# Patient Record
Sex: FEMALE | Race: WHITE | Employment: UNEMPLOYED | URBAN - METROPOLITAN AREA
[De-identification: names, ages, dates, MRNs, and addresses within clinical notes are randomized per-mention and may not be internally consistent; named-entity substitution may affect disease eponyms.]

---

## 2024-01-11 ENCOUNTER — APPOINTMENT (OUTPATIENT)
Dept: GENERAL RADIOLOGY | Age: 79
DRG: 101 | End: 2024-01-11
Payer: MEDICARE

## 2024-01-11 ENCOUNTER — APPOINTMENT (OUTPATIENT)
Dept: CT IMAGING | Age: 79
DRG: 101 | End: 2024-01-11
Payer: MEDICARE

## 2024-01-11 ENCOUNTER — HOSPITAL ENCOUNTER (INPATIENT)
Age: 79
LOS: 1 days | Discharge: HOME | DRG: 101 | End: 2024-01-13
Attending: STUDENT IN AN ORGANIZED HEALTH CARE EDUCATION/TRAINING PROGRAM | Admitting: INTERNAL MEDICINE
Payer: MEDICARE

## 2024-01-11 DIAGNOSIS — R56.9 SEIZURE (HCC): ICD-10-CM

## 2024-01-11 DIAGNOSIS — R56.9 SEIZURE-LIKE ACTIVITY (HCC): ICD-10-CM
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
## 2024-01-11 DIAGNOSIS — S09.90XA TRAUMATIC INJURY OF HEAD, INITIAL ENCOUNTER: Primary | ICD-10-CM

## 2024-01-11 DIAGNOSIS — S01.01XA LACERATION OF SCALP, INITIAL ENCOUNTER: ICD-10-CM

## 2024-01-11 LAB
B.E.: -1.2 MMOL/L (ref -3–3)
COHB: 3.2 % (ref 0–1.5)
CRITICAL: ABNORMAL
DATE ANALYZED: ABNORMAL
DATE OF COLLECTION: ABNORMAL
ERYTHROCYTE [DISTWIDTH] IN BLOOD BY AUTOMATED COUNT: 13.9 % (ref 11.5–15)
HCO3: 23.7 MMOL/L (ref 22–26)
HCT VFR BLD AUTO: 34.8 % (ref 34–48)
HGB BLD-MCNC: 11.4 G/DL (ref 11.5–15.5)
HHB: 0.5 % (ref 0–5)
INR PPP: 1.2
LAB: ABNORMAL
Lab: 2255
MCH RBC QN AUTO: 31.1 PG (ref 26–35)
MCHC RBC AUTO-ENTMCNC: 32.8 G/DL (ref 32–34.5)
MCV RBC AUTO: 94.8 FL (ref 80–99.9)
METHB: 0.2 % (ref 0–1.5)
MODE: ABNORMAL
O2 CONTENT: 17.3 ML/DL
O2 SATURATION: 99.5 % (ref 92–98.5)
O2HB: 96.1 % (ref 94–97)
OPERATOR ID: 2577
PARTIAL THROMBOPLASTIN TIME: 32.8 SEC (ref 24.5–35.1)
PATIENT TEMP: 37 C
PCO2: 40.3 MMHG (ref 35–45)
PH BLOOD GAS: 7.39 (ref 7.35–7.45)
PLATELET # BLD AUTO: 293 K/UL (ref 130–450)
PMV BLD AUTO: 9.1 FL (ref 7–12)
PO2: 361.7 MMHG (ref 75–100)
POTASSIUM SERPL-SCNC: 4.34 MMOL/L (ref 3.5–5)
PROTHROMBIN TIME: 13.2 SEC (ref 9.3–12.4)
RBC # BLD AUTO: 3.67 M/UL (ref 3.5–5.5)
SOURCE, BLOOD GAS: ABNORMAL
THB: 12.1 G/DL (ref 11.5–16.5)
TIME ANALYZED: 2257
WBC OTHER # BLD: 12.9 K/UL (ref 4.5–11.5)

## 2024-01-11 PROCEDURE — 73551 X-RAY EXAM OF FEMUR 1: CPT

## 2024-01-11 PROCEDURE — 85027 COMPLETE CBC AUTOMATED: CPT

## 2024-01-11 PROCEDURE — 86920 COMPATIBILITY TEST SPIN: CPT

## 2024-01-11 PROCEDURE — 84132 ASSAY OF SERUM POTASSIUM: CPT

## 2024-01-11 PROCEDURE — 80179 DRUG ASSAY SALICYLATE: CPT

## 2024-01-11 PROCEDURE — 82550 ASSAY OF CK (CPK): CPT

## 2024-01-11 PROCEDURE — 86901 BLOOD TYPING SEROLOGIC RH(D): CPT

## 2024-01-11 PROCEDURE — 72170 X-RAY EXAM OF PELVIS: CPT

## 2024-01-11 PROCEDURE — 85730 THROMBOPLASTIN TIME PARTIAL: CPT

## 2024-01-11 PROCEDURE — 86870 RBC ANTIBODY IDENTIFICATION: CPT

## 2024-01-11 PROCEDURE — 72125 CT NECK SPINE W/O DYE: CPT

## 2024-01-11 PROCEDURE — 13121 CMPLX RPR S/A/L 2.6-7.5 CM: CPT

## 2024-01-11 PROCEDURE — 71045 X-RAY EXAM CHEST 1 VIEW: CPT

## 2024-01-11 PROCEDURE — 86900 BLOOD TYPING SEROLOGIC ABO: CPT

## 2024-01-11 PROCEDURE — 70450 CT HEAD/BRAIN W/O DYE: CPT

## 2024-01-11 PROCEDURE — 99285 EMERGENCY DEPT VISIT HI MDM: CPT

## 2024-01-11 PROCEDURE — 80143 DRUG ASSAY ACETAMINOPHEN: CPT

## 2024-01-11 PROCEDURE — 85347 COAGULATION TIME ACTIVATED: CPT

## 2024-01-11 PROCEDURE — 86850 RBC ANTIBODY SCREEN: CPT

## 2024-01-11 PROCEDURE — 83605 ASSAY OF LACTIC ACID: CPT

## 2024-01-11 PROCEDURE — 82805 BLOOD GASES W/O2 SATURATION: CPT

## 2024-01-11 PROCEDURE — 86922 COMPATIBILITY TEST ANTIGLOB: CPT

## 2024-01-11 PROCEDURE — 80053 COMPREHEN METABOLIC PANEL: CPT

## 2024-01-11 PROCEDURE — 85576 BLOOD PLATELET AGGREGATION: CPT

## 2024-01-11 PROCEDURE — 6810039001 HC L1 TRAUMA PRIORITY

## 2024-01-11 PROCEDURE — 86880 COOMBS TEST DIRECT: CPT

## 2024-01-11 PROCEDURE — 84146 ASSAY OF PROLACTIN: CPT

## 2024-01-11 PROCEDURE — 85610 PROTHROMBIN TIME: CPT

## 2024-01-11 PROCEDURE — 80307 DRUG TEST PRSMV CHEM ANLYZR: CPT

## 2024-01-11 PROCEDURE — 85384 FIBRINOGEN ACTIVITY: CPT

## 2024-01-11 PROCEDURE — 85390 FIBRINOLYSINS SCREEN I&R: CPT

## 2024-01-11 PROCEDURE — G0480 DRUG TEST DEF 1-7 CLASSES: HCPCS

## 2024-01-11 RX ORDER — LIDOCAINE HYDROCHLORIDE AND EPINEPHRINE 10; 10 MG/ML; UG/ML
20 INJECTION, SOLUTION INFILTRATION; PERINEURAL ONCE
Status: COMPLETED | OUTPATIENT
Start: 2024-01-11 | End: 2024-01-12

## 2024-01-11 RX ORDER — TETANUS AND DIPHTHERIA TOXOIDS ADSORBED 2; 2 [LF]/.5ML; [LF]/.5ML
0.5 INJECTION INTRAMUSCULAR ONCE
Status: DISCONTINUED | OUTPATIENT
Start: 2024-01-11 | End: 2024-01-13 | Stop reason: HOSPADM

## 2024-01-12 ENCOUNTER — APPOINTMENT (OUTPATIENT)
Dept: NEUROLOGY | Age: 79
DRG: 101 | End: 2024-01-12
Payer: MEDICARE

## 2024-01-12 ENCOUNTER — APPOINTMENT (OUTPATIENT)
Dept: CT IMAGING | Age: 79
DRG: 101 | End: 2024-01-12
Attending: INTERNAL MEDICINE
Payer: MEDICARE

## 2024-01-12 PROBLEM — R56.9 SEIZURE (HCC): Status: ACTIVE | Noted: 2024-01-12

## 2024-01-12 PROBLEM — S09.90XA HEAD TRAUMA: Status: ACTIVE | Noted: 2024-01-12

## 2024-01-12 PROBLEM — S01.01XA SCALP LACERATION: Status: ACTIVE | Noted: 2024-01-12

## 2024-01-12 PROBLEM — S09.90XS HEAD TRAUMA, SEQUELA: Status: ACTIVE | Noted: 2024-01-12
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
## 2024-01-12 LAB
ABO/RH: NORMAL
ALBUMIN SERPL-MCNC: 3.1 G/DL (ref 3.5–5.2)
ALBUMIN SERPL-MCNC: 3.8 G/DL (ref 3.5–5.2)
ALP SERPL-CCNC: 73 U/L (ref 35–104)
ALP SERPL-CCNC: 97 U/L (ref 35–104)
ALT SERPL-CCNC: 12 U/L (ref 0–32)
ALT SERPL-CCNC: 14 U/L (ref 0–32)
AMPHET UR QL SCN: NEGATIVE
ANION GAP SERPL CALCULATED.3IONS-SCNC: 11 MMOL/L (ref 7–16)
ANION GAP SERPL CALCULATED.3IONS-SCNC: 14 MMOL/L (ref 7–16)
ANTIBODY IDENTIFICATION: NORMAL
ANTIBODY SCREEN: POSITIVE
APAP SERPL-MCNC: <5 UG/ML (ref 10–30)
ARM BAND NUMBER: NORMAL
AST SERPL-CCNC: 14 U/L (ref 0–31)
AST SERPL-CCNC: 19 U/L (ref 0–31)
BARBITURATES UR QL SCN: NEGATIVE
BASOPHILS # BLD: 0.07 K/UL (ref 0–0.2)
BASOPHILS NFR BLD: 1 % (ref 0–2)
BENZODIAZ UR QL: NEGATIVE
BILIRUB SERPL-MCNC: 0.3 MG/DL (ref 0–1.2)
BILIRUB SERPL-MCNC: 0.4 MG/DL (ref 0–1.2)
BLOOD BANK COMMENT: NORMAL
BLOOD BANK COMMENT: NORMAL
BLOOD BANK DISPENSE STATUS: NORMAL
BLOOD BANK SAMPLE EXPIRATION: NORMAL
BPU ID: NORMAL
BUN SERPL-MCNC: 15 MG/DL (ref 6–23)
BUN SERPL-MCNC: 15 MG/DL (ref 6–23)
BUPRENORPHINE UR QL: NEGATIVE
CALCIUM SERPL-MCNC: 7.5 MG/DL (ref 8.6–10.2)
CALCIUM SERPL-MCNC: 8.8 MG/DL (ref 8.6–10.2)
CANNABINOIDS UR QL SCN: NEGATIVE
CHLORIDE SERPL-SCNC: 104 MMOL/L (ref 98–107)
CHLORIDE SERPL-SCNC: 97 MMOL/L (ref 98–107)
CK SERPL-CCNC: 64 U/L (ref 20–180)
CLOT ANGLE.KAOLIN INDUCED BLD RES TEG: 75.6 DEG (ref 53–70)
CO2 SERPL-SCNC: 21 MMOL/L (ref 22–29)
CO2 SERPL-SCNC: 22 MMOL/L (ref 22–29)
COCAINE UR QL SCN: NEGATIVE
COMPONENT: NORMAL
CREAT SERPL-MCNC: 0.6 MG/DL (ref 0.5–1)
CREAT SERPL-MCNC: 0.7 MG/DL (ref 0.5–1)
CROSSMATCH RESULT: NORMAL
DAT, POLYSPECIFIC: NEGATIVE
EKG ATRIAL RATE: 81 BPM
EKG P AXIS: 53 DEGREES
EKG P-R INTERVAL: 204 MS
EKG Q-T INTERVAL: 396 MS
EKG QRS DURATION: 86 MS
EKG QTC CALCULATION (BAZETT): 460 MS
EKG R AXIS: 57 DEGREES
EKG T AXIS: 65 DEGREES
EKG VENTRICULAR RATE: 81 BPM
EOSINOPHIL # BLD: 0.1 K/UL (ref 0.05–0.5)
EOSINOPHILS RELATIVE PERCENT: 1 % (ref 0–6)
EPL-TEG: 1.4 % (ref 0–15)
ERYTHROCYTE [DISTWIDTH] IN BLOOD BY AUTOMATED COUNT: 13.8 % (ref 11.5–15)
ETHANOLAMINE SERPL-MCNC: <10 MG/DL
FENTANYL UR QL: NEGATIVE
G-TEG: 12.4 KDYN/CM2 (ref 4.5–11)
GFR SERPL CREATININE-BSD FRML MDRD: >60 ML/MIN/1.73M2
GFR SERPL CREATININE-BSD FRML MDRD: >60 ML/MIN/1.73M2
GLUCOSE SERPL-MCNC: 111 MG/DL (ref 74–99)
GLUCOSE SERPL-MCNC: 91 MG/DL (ref 74–99)
HCT VFR BLD AUTO: 26.7 % (ref 34–48)
HGB BLD-MCNC: 8.9 G/DL (ref 11.5–15.5)
IMM GRANULOCYTES # BLD AUTO: 0.05 K/UL (ref 0–0.58)
IMM GRANULOCYTES NFR BLD: 1 % (ref 0–5)
KINETICS TEG: 1 MIN (ref 1–3)
LACTATE BLDV-SCNC: 2.2 MMOL/L (ref 0.5–2.2)
LY30 (LYSIS) TEG: 1.4 % (ref 0–8)
LYMPHOCYTES NFR BLD: 0.78 K/UL (ref 1.5–4)
LYMPHOCYTES RELATIVE PERCENT: 8 % (ref 20–42)
MA (MAX CLOT) TEG: 71.3 MM (ref 50–70)
MCH RBC QN AUTO: 31.4 PG (ref 26–35)
MCHC RBC AUTO-ENTMCNC: 33.3 G/DL (ref 32–34.5)
MCV RBC AUTO: 94.3 FL (ref 80–99.9)
METHADONE UR QL: NEGATIVE
MONOCYTES NFR BLD: 0.73 K/UL (ref 0.1–0.95)
MONOCYTES NFR BLD: 8 % (ref 2–12)
NEUTROPHILS NFR BLD: 81 % (ref 43–80)
NEUTS SEG NFR BLD: 7.59 K/UL (ref 1.8–7.3)
OPIATES UR QL SCN: NEGATIVE
OXYCODONE UR QL SCN: NEGATIVE
PCP UR QL SCN: NEGATIVE
PLATELET # BLD AUTO: 141 K/UL (ref 130–450)
PMV BLD AUTO: 10.1 FL (ref 7–12)
POTASSIUM SERPL-SCNC: 4.5 MMOL/L (ref 3.5–5)
POTASSIUM SERPL-SCNC: 4.9 MMOL/L (ref 3.5–5)
PROLACTIN SERPL-MCNC: 31.72 NG/ML
PROT SERPL-MCNC: 5.1 G/DL (ref 6.4–8.3)
PROT SERPL-MCNC: 6.6 G/DL (ref 6.4–8.3)
RBC # BLD AUTO: 2.83 M/UL (ref 3.5–5.5)
REACTION TIME TEG: 4.2 MIN (ref 5–10)
SALICYLATES SERPL-MCNC: <0.3 MG/DL (ref 0–30)
SODIUM SERPL-SCNC: 133 MMOL/L (ref 132–146)
SODIUM SERPL-SCNC: 136 MMOL/L (ref 132–146)
TEST INFORMATION: NORMAL
TOXIC TRICYCLIC SC,BLOOD: NEGATIVE
TRANSFUSION STATUS: NORMAL
TROPONIN I SERPL HS-MCNC: 13 NG/L (ref 0–9)
UNIT DIVISION: 0
WBC OTHER # BLD: 9.3 K/UL (ref 4.5–11.5)

## 2024-01-12 PROCEDURE — 93010 ELECTROCARDIOGRAM REPORT: CPT | Performed by: INTERNAL MEDICINE

## 2024-01-12 PROCEDURE — 80053 COMPREHEN METABOLIC PANEL: CPT

## 2024-01-12 PROCEDURE — 95819 EEG AWAKE AND ASLEEP: CPT

## 2024-01-12 PROCEDURE — 70450 CT HEAD/BRAIN W/O DYE: CPT

## 2024-01-12 PROCEDURE — 95819 EEG AWAKE AND ASLEEP: CPT | Performed by: PSYCHIATRY & NEUROLOGY

## 2024-01-12 PROCEDURE — 0HQ0XZZ REPAIR SCALP SKIN, EXTERNAL APPROACH: ICD-10-PCS | Performed by: STUDENT IN AN ORGANIZED HEALTH CARE EDUCATION/TRAINING PROGRAM

## 2024-01-12 PROCEDURE — 2060000000 HC ICU INTERMEDIATE R&B

## 2024-01-12 PROCEDURE — 2580000003 HC RX 258: Performed by: INTERNAL MEDICINE

## 2024-01-12 PROCEDURE — 2580000003 HC RX 258: Performed by: STUDENT IN AN ORGANIZED HEALTH CARE EDUCATION/TRAINING PROGRAM

## 2024-01-12 PROCEDURE — 85025 COMPLETE CBC W/AUTO DIFF WBC: CPT

## 2024-01-12 PROCEDURE — 6370000000 HC RX 637 (ALT 250 FOR IP): Performed by: PSYCHIATRY & NEUROLOGY

## 2024-01-12 PROCEDURE — 93005 ELECTROCARDIOGRAM TRACING: CPT | Performed by: INTERNAL MEDICINE

## 2024-01-12 PROCEDURE — 6370000000 HC RX 637 (ALT 250 FOR IP): Performed by: INTERNAL MEDICINE

## 2024-01-12 PROCEDURE — 6360000002 HC RX W HCPCS: Performed by: STUDENT IN AN ORGANIZED HEALTH CARE EDUCATION/TRAINING PROGRAM

## 2024-01-12 PROCEDURE — 2700000000 HC OXYGEN THERAPY PER DAY

## 2024-01-12 PROCEDURE — 2500000003 HC RX 250 WO HCPCS: Performed by: STUDENT IN AN ORGANIZED HEALTH CARE EDUCATION/TRAINING PROGRAM

## 2024-01-12 PROCEDURE — 6360000002 HC RX W HCPCS

## 2024-01-12 PROCEDURE — 80307 DRUG TEST PRSMV CHEM ANLYZR: CPT

## 2024-01-12 PROCEDURE — 6370000000 HC RX 637 (ALT 250 FOR IP)

## 2024-01-12 PROCEDURE — 99223 1ST HOSP IP/OBS HIGH 75: CPT | Performed by: INTERNAL MEDICINE

## 2024-01-12 PROCEDURE — 84484 ASSAY OF TROPONIN QUANT: CPT

## 2024-01-12 RX ORDER — FOLIC ACID 1 MG/1
1 TABLET ORAL SEE ADMIN INSTRUCTIONS
COMMUNITY

## 2024-01-12 RX ORDER — AMLODIPINE BESYLATE 10 MG/1
10 TABLET ORAL DAILY
COMMUNITY

## 2024-01-12 RX ORDER — DOCUSATE SODIUM 100 MG/1
200 CAPSULE, LIQUID FILLED ORAL NIGHTLY
COMMUNITY

## 2024-01-12 RX ORDER — SODIUM CHLORIDE 0.9 % (FLUSH) 0.9 %
5-40 SYRINGE (ML) INJECTION PRN
Status: DISCONTINUED | OUTPATIENT
Start: 2024-01-12 | End: 2024-01-13 | Stop reason: HOSPADM

## 2024-01-12 RX ORDER — LORAZEPAM 2 MG/ML
INJECTION INTRAMUSCULAR
Status: COMPLETED
Start: 2024-01-12 | End: 2024-01-12

## 2024-01-12 RX ORDER — ACETAMINOPHEN 325 MG/1
650 TABLET ORAL EVERY 6 HOURS PRN
COMMUNITY

## 2024-01-12 RX ORDER — PSYLLIUM HUSK 0.4 G
0.52 CAPSULE ORAL 3 TIMES DAILY
COMMUNITY

## 2024-01-12 RX ORDER — MAGNESIUM SULFATE IN WATER 40 MG/ML
2000 INJECTION, SOLUTION INTRAVENOUS PRN
Status: DISCONTINUED | OUTPATIENT
Start: 2024-01-12 | End: 2024-01-13 | Stop reason: HOSPADM

## 2024-01-12 RX ORDER — SODIUM CHLORIDE 0.9 % (FLUSH) 0.9 %
5-40 SYRINGE (ML) INJECTION EVERY 12 HOURS SCHEDULED
Status: DISCONTINUED | OUTPATIENT
Start: 2024-01-12 | End: 2024-01-13 | Stop reason: HOSPADM

## 2024-01-12 RX ORDER — ALBUTEROL SULFATE 90 UG/1
2 AEROSOL, METERED RESPIRATORY (INHALATION) EVERY 4 HOURS PRN
COMMUNITY

## 2024-01-12 RX ORDER — CAPSAICIN 0.75 MG/G
CREAM TOPICAL 2 TIMES DAILY PRN
COMMUNITY

## 2024-01-12 RX ORDER — OMEPRAZOLE 20 MG/1
20 CAPSULE, DELAYED RELEASE ORAL DAILY
COMMUNITY

## 2024-01-12 RX ORDER — DONEPEZIL HYDROCHLORIDE 10 MG/1
10 TABLET, FILM COATED ORAL NIGHTLY
COMMUNITY

## 2024-01-12 RX ORDER — PREDNISONE 2.5 MG/1
2.5 TABLET ORAL DAILY
COMMUNITY

## 2024-01-12 RX ORDER — MAGNESIUM HYDROXIDE/ALUMINUM HYDROXICE/SIMETHICONE 120; 1200; 1200 MG/30ML; MG/30ML; MG/30ML
30 SUSPENSION ORAL EVERY 6 HOURS PRN
Status: DISCONTINUED | OUTPATIENT
Start: 2024-01-12 | End: 2024-01-13 | Stop reason: HOSPADM

## 2024-01-12 RX ORDER — ASPIRIN 81 MG/1
81 TABLET ORAL DAILY
COMMUNITY

## 2024-01-12 RX ORDER — POTASSIUM CHLORIDE 7.45 MG/ML
10 INJECTION INTRAVENOUS PRN
Status: DISCONTINUED | OUTPATIENT
Start: 2024-01-12 | End: 2024-01-13 | Stop reason: HOSPADM

## 2024-01-12 RX ORDER — SODIUM CHLORIDE 9 MG/ML
INJECTION, SOLUTION INTRAVENOUS PRN
Status: DISCONTINUED | OUTPATIENT
Start: 2024-01-12 | End: 2024-01-13 | Stop reason: HOSPADM

## 2024-01-12 RX ORDER — LEVETIRACETAM 500 MG/5ML
INJECTION, SOLUTION, CONCENTRATE INTRAVENOUS
Status: COMPLETED
Start: 2024-01-12 | End: 2024-01-12

## 2024-01-12 RX ORDER — ARIPIPRAZOLE 2 MG/1
4 TABLET ORAL DAILY
COMMUNITY

## 2024-01-12 RX ORDER — ONDANSETRON 2 MG/ML
4 INJECTION INTRAMUSCULAR; INTRAVENOUS EVERY 6 HOURS PRN
Status: DISCONTINUED | OUTPATIENT
Start: 2024-01-12 | End: 2024-01-13 | Stop reason: HOSPADM

## 2024-01-12 RX ORDER — LEVETIRACETAM 500 MG/5ML
1000 INJECTION, SOLUTION, CONCENTRATE INTRAVENOUS ONCE
Status: COMPLETED | OUTPATIENT
Start: 2024-01-12 | End: 2024-01-12

## 2024-01-12 RX ORDER — LISINOPRIL 20 MG/1
20 TABLET ORAL DAILY
COMMUNITY

## 2024-01-12 RX ORDER — ONDANSETRON 4 MG/1
4 TABLET, ORALLY DISINTEGRATING ORAL EVERY 8 HOURS PRN
Status: DISCONTINUED | OUTPATIENT
Start: 2024-01-12 | End: 2024-01-13 | Stop reason: HOSPADM

## 2024-01-12 RX ORDER — ASCORBIC ACID 250 MG
250 TABLET ORAL DAILY
COMMUNITY

## 2024-01-12 RX ORDER — MEMANTINE HYDROCHLORIDE 5 MG/1
15 TABLET ORAL NIGHTLY
COMMUNITY

## 2024-01-12 RX ORDER — GABAPENTIN 400 MG/1
400 CAPSULE ORAL 3 TIMES DAILY
COMMUNITY

## 2024-01-12 RX ORDER — LACOSAMIDE 100 MG/1
100 TABLET ORAL 2 TIMES DAILY
Status: ON HOLD | COMMUNITY
End: 2024-01-13 | Stop reason: HOSPADM

## 2024-01-12 RX ORDER — ATORVASTATIN CALCIUM 40 MG/1
40 TABLET, FILM COATED ORAL
COMMUNITY

## 2024-01-12 RX ORDER — ACETAMINOPHEN 325 MG/1
650 TABLET ORAL EVERY 6 HOURS PRN
Status: DISCONTINUED | OUTPATIENT
Start: 2024-01-12 | End: 2024-01-13 | Stop reason: HOSPADM

## 2024-01-12 RX ORDER — SENNOSIDES A AND B 8.6 MG/1
1 TABLET, FILM COATED ORAL DAILY PRN
Status: DISCONTINUED | OUTPATIENT
Start: 2024-01-12 | End: 2024-01-13 | Stop reason: HOSPADM

## 2024-01-12 RX ORDER — ACETAMINOPHEN 650 MG/1
650 SUPPOSITORY RECTAL EVERY 6 HOURS PRN
Status: DISCONTINUED | OUTPATIENT
Start: 2024-01-12 | End: 2024-01-13 | Stop reason: HOSPADM

## 2024-01-12 RX ORDER — POLYETHYLENE GLYCOL 3350 17 G/17G
17 POWDER, FOR SOLUTION ORAL DAILY
COMMUNITY

## 2024-01-12 RX ORDER — SODIUM CHLORIDE 9 MG/ML
INJECTION, SOLUTION INTRAVENOUS CONTINUOUS
Status: ACTIVE | OUTPATIENT
Start: 2024-01-12 | End: 2024-01-12

## 2024-01-12 RX ORDER — LORAZEPAM 2 MG/ML
1 INJECTION INTRAMUSCULAR ONCE
Status: COMPLETED | OUTPATIENT
Start: 2024-01-12 | End: 2024-01-12

## 2024-01-12 RX ORDER — BACITRACIN ZINC 500 [USP'U]/G
OINTMENT TOPICAL 3 TIMES DAILY
Status: DISCONTINUED | OUTPATIENT
Start: 2024-01-12 | End: 2024-01-13 | Stop reason: HOSPADM

## 2024-01-12 RX ORDER — POTASSIUM CHLORIDE 20 MEQ/1
40 TABLET, EXTENDED RELEASE ORAL PRN
Status: DISCONTINUED | OUTPATIENT
Start: 2024-01-12 | End: 2024-01-13 | Stop reason: HOSPADM

## 2024-01-12 RX ADMIN — ACETAMINOPHEN 650 MG: 325 TABLET ORAL at 22:58

## 2024-01-12 RX ADMIN — WATER 2000 MG: 1 INJECTION INTRAMUSCULAR; INTRAVENOUS; SUBCUTANEOUS at 03:04

## 2024-01-12 RX ADMIN — LEVETIRACETAM 1000 MG: 100 INJECTION INTRAVENOUS at 08:52

## 2024-01-12 RX ADMIN — LEVETIRACETAM 1000 MG: 500 INJECTION, SOLUTION, CONCENTRATE INTRAVENOUS at 08:52

## 2024-01-12 RX ADMIN — LACOSAMIDE 150 MG: 100 TABLET, FILM COATED ORAL at 20:40

## 2024-01-12 RX ADMIN — BACITRACIN ZINC: 500 OINTMENT TOPICAL at 16:09

## 2024-01-12 RX ADMIN — SODIUM CHLORIDE: 9 INJECTION, SOLUTION INTRAVENOUS at 03:26

## 2024-01-12 RX ADMIN — LORAZEPAM 1 MG: 2 INJECTION INTRAMUSCULAR; INTRAVENOUS at 08:49

## 2024-01-12 RX ADMIN — SODIUM CHLORIDE, PRESERVATIVE FREE 10 ML: 5 INJECTION INTRAVENOUS at 20:41

## 2024-01-12 RX ADMIN — LIDOCAINE HYDROCHLORIDE AND EPINEPHRINE 20 ML: 10; 10 INJECTION, SOLUTION INFILTRATION; PERINEURAL at 02:53

## 2024-01-12 RX ADMIN — LORAZEPAM 1 MG: 2 INJECTION INTRAMUSCULAR at 08:49

## 2024-01-12 RX ADMIN — BACITRACIN ZINC: 500 OINTMENT TOPICAL at 20:42

## 2024-01-12 ASSESSMENT — PAIN DESCRIPTION - LOCATION: LOCATION: HEAD

## 2024-01-12 ASSESSMENT — PAIN - FUNCTIONAL ASSESSMENT: PAIN_FUNCTIONAL_ASSESSMENT: NONE - DENIES PAIN

## 2024-01-12 ASSESSMENT — PAIN DESCRIPTION - DESCRIPTORS: DESCRIPTORS: ACHING;SORE;SHARP

## 2024-01-12 ASSESSMENT — PAIN DESCRIPTION - ORIENTATION: ORIENTATION: POSTERIOR;MID

## 2024-01-12 ASSESSMENT — PAIN SCALES - GENERAL: PAINLEVEL_OUTOF10: 8

## 2024-01-12 NOTE — PROGRESS NOTES
4 Eyes Skin Assessment     NAME:  Shara Leary  YOB: 1945  MEDICAL RECORD NUMBER:  [id number]    The patient is being assessed for  Admission    I agree that at least one RN has performed a thorough Head to Toe Skin Assessment on the patient. ALL assessment sites listed below have been assessed.      Areas assessed by both nurses:    Head, Face, Ears, Shoulders, Back, Chest, Arms, Elbows, Hands, Sacrum. Buttock, Coccyx, Ischium, Legs. Feet and Heels, and Under Medical Devices         Does the Patient have a Wound? No noted wound(s)       Chris Prevention initiated by RN: No  Wound Care Orders initiated by RN: No    Pressure Injury (Stage 3,4, Unstageable, DTI, NWPT, and Complex wounds) if present, place Wound referral order by RN under : No    New Ostomies, if present place, Ostomy referral order under : No     Nurse 1 eSignature: Electronically signed by Bianca Maciel RN on 1/12/24 at 6:47 PM EST    **SHARE this note so that the co-signing nurse can place an eSignature**    Nurse 2 eSignature: Electronically signed by Vickie Daniels RN on 1/13/24 at 10:05 AM EST

## 2024-01-12 NOTE — PROGRESS NOTES
Cervical Spine C Collar Clearance -  Patient CT Spine Imaging normal.  Patient does not complain of Cervical Spine tenderness upon palpation.  Patients C-Spine ranged.  C-spine clear, no need for C-Collar.    Electronically signed by Regine Winston DO on 1/12/2024 at 2:15 AM

## 2024-01-12 NOTE — DISCHARGE INSTRUCTIONS
in the shower.    Follow-up:  Trauma Clinic: [phone]--press option 2  [facility]  [address] [id number]          SPECIAL CONSIDERATIONS FOR OUR PATIENTS OVER THE AGE OF 65Y    Getting around your home safely can be a challenge if you have injuries or health problems that make it easy for you to fall. Loose rugs and furniture in walkways are among the dangers for many older people who have problems walking or who have poor eyesight. People who have conditions such as arthritis, osteoporosis, or dementia also must be careful not to fall.    You can make your home safer with a few simple measures.    Follow-up care is a key part of your treatment and safety. Be sure to make and go to all appointments, and call your doctor or nurse call line if you are having problems. It's also a good idea to know your test results and keep a list of the medicines you take.    How can you care for yourself at home?  Taking care of yourself  You may get dizzy if you do not drink enough water. To prevent dehydration, drink plenty of fluids, enough so that your urine is light yellow or clear like water. Choose water and other caffeine-free clear liquids. If you have kidney, heart, or liver disease and have to limit fluids, talk with your doctor before you increase the amount of fluids you drink.  Exercise regularly to improve your strength, muscle tone, and balance. Walk if you can. Swimming may be a good choice if you cannot walk easily.  Have your vision and hearing checked each year or any time you notice a change. If you have trouble seeing and hearing, you might not be able to avoid objects and could lose your balance.  Know the side effects of the medicines you take. Ask your doctor or pharmacist whether the medicines you take can affect your balance. Sleeping pills or sedatives can affect your balance.  Limit the amount of alcohol you drink. Alcohol can impair your balance and  other senses.  Ask your doctor whether calluses or corns on your feet need to be removed. If you wear loose-fitting shoes because of calluses or corns, you can lose your balance and fall.  Talk to your doctor if you have numbness in your feet.    Preventing falls at home  Remove raised doorway thresholds, throw rugs, and clutter. Repair loose carpet or raised areas in the floor.  Move furniture and electrical cords to keep them out of walking paths.  Use non-skid floor wax, and wipe up spills right away, especially on ceramic tile floors.  If you use a walker or cane, put rubber tips on it. If you use crutches, clean the bottoms of them regularly with an abrasive pad, such as steel wool.  Keep your house well lit, especially stairways, porches, and outside walkways. Use night-lights in areas such as hallways and washrooms. Add extra light switches or use remote switches (such as switches that go on or off when you clap your hands) to make it easier to turn lights on if you have to get up during the night.  Install sturdy handrails on stairways.  Move items in your cabinets so that the things you use a lot are on the lower shelves (about waist level).  Keep a cordless phone and a flashlight with new batteries by your bed. If possible, put a phone in each of the main rooms of your house, or carry a cell phone in case you fall and cannot reach a phone. Or, you can wear a device around your neck or wrist. You push a button that sends a signal for help.  Wear low-heeled shoes that fit well and give your feet good support. Use footwear with non-skid soles. Check the heels and soles of your shoes for wear. Repair or replace worn heels or soles.  Do not wear socks without shoes on wood floors.  Walk on the grass when the sidewalks are slippery. If you live in an area that gets snow and ice in the winter, sprinkle salt on slippery steps and sidewalks.    Preventing falls in the bath  Install grab bars and non-skid mats

## 2024-01-12 NOTE — ED NOTES
At aprox 0842 pt began having a seizure that lasted aprox 45 seconds. Nurse at bedside, er physicians  also at bedside. Medications ordered and trauma team notified

## 2024-01-12 NOTE — H&P
[facility] Hospitalist Group History and Physical    PATIENT NAME:  Shara Leary    MRN:  [id number]  SERVICE DATE:  01/12/24    Primary Care Physician: No primary care provider on file.       SUBJECTIVE  CHIEF COMPLAINT:  had concerns including Fall.    HPI:  Ms. Shara Leary, a 78 y.o. year old female  who  has no past medical history on file. presents with Fall  , found on ground, trauma called as head trauma, thought to have seizure, as she did have seizure earlier today at her pcp office. Pt reports taking vimpat and warfarin with INR 1.2    PAST MEDICAL HISTORY:  No past medical history on file.  PAST SURGICAL HISTORY:  No past surgical history on file.  FAMILY HISTORY:  No family history on file.  SOCIAL HISTORY:    Social History     Socioeconomic History    Marital status: Single     Spouse name: Not on file    Number of children: Not on file    Years of education: Not on file    Highest education level: Not on file   Occupational History    Not on file   Tobacco Use    Smoking status: Not on file    Smokeless tobacco: Not on file   Substance and Sexual Activity    Alcohol use: Not on file    Drug use: Not on file    Sexual activity: Not on file   Other Topics Concern    Not on file   Social History Narrative    Not on file     Social Determinants of Health     Financial Resource Strain: Not on file   Food Insecurity: Not on file   Transportation Needs: Not on file   Physical Activity: Not on file   Stress: Not on file   Social Connections: Not on file   Intimate Partner Violence: Not on file   Housing Stability: Not on file    TOBACCO:   has no history on file for tobacco use.  ETOH:   has no history on file for alcohol use.  MEDICATIONS:   Prior to Admission medications    Not on File        ALLERGIES: Patient has no allergy information on record.    REVIEW OF SYSTEM:   ROS as noted in HPI, 12 point ROS reviewed and otherwise negative.    OBJECTIVE  PHYSICAL EXAM:   Vitals:    01/11/24 2256  acute fractures seen in the left femur from the left hip joint through the   area of the femoral condyles but the evaluation of the left knee is limited   on this study.  Consider x-ray series of left knee for evaluation of the   femoral condyles and left knee joint.         XR PELVIS (1-2 VIEWS)   Final Result   No acute fractures of the pelvic bones.  No acute fractures or dislocation of   the hip joints.         XR CHEST PORTABLE   Final Result   No acute cardiopulmonary process.               ASSESSMENT AND PLAN  Active Problems:    Seizure (HCC)    Fall    Scalp laceration  Resolved Problems:    * No resolved hospital problems. *    - Head trauma and scalp laceration  - Seizure  - Fall    Plan:  - admit to tele monitoring   - neuro checks  - follow with Trauma team recommendations  - repeat CTH in am and plan for MRIb, will follow Neurology recs.  - consult Neurology, resume home antiseizure regimen   - seizure precautions  - fall precautions   - iv fluids    Further management and plan adjustment will be taken over by primary hospitalist provider.   VTE Prophylaxis: scd  DVT Prophylaxis: []Lovenox []Heparin []PCD [] Warfarin/NOAC []Encouraged ambulation    Diet: No diet orders on file  Code Status: No Order  Surrogate decision maker confirmed with patient:  Primary Emergency Contact: enrico sawant      Disposition: []Med/Surg [x] Intermediate [] ICU/CCU   Admit status: [] Observation [x] Inpatient       Additional work up or/and treatment plan may be added today or thereafter based on clinical progression. I am managing a portion of pt care. Some medical issues are handled by other specialists. Additional work up and treatment should be done by my colleague hospitalist and at out pt setting by pt PCP and other out pt providers.     SIGNATURE: Chapo Weeks MD

## 2024-01-12 NOTE — PROCEDURES
Laceration Repair Procedure Note    Procedure: Laceration repair of scalp: skin, subcutaneous tissue, fascia. 5cm    Indication: Laceration to posterior scalp    Procedure: A 5.5cm laceration was present to the posterior scalp. Anesthesia around the laceration achieved using injection of 20mL 1% lidocaine w epinephrine. The wound was copiously irrigated using sterile saline. The wound area was then cleansed with Betadine and draped in a sterile fashion. The laceration extended down to the level of bone. A 1x0.5cm area of non-viable skin was sharply debrided. Multiple areas of pulsatile bleeding were controlled using Vicryl sutures placed in figure of eight fashion. The laceration was closed multiple layers. 2-0 Vicryl suture was used to close the galea. 3-0 interrupted Vicryl suture was used to approximate the dermis. Vertical mattress 3-0 Prolene was used to approximate the skin. Flaps were aligned. No foreign body was identified.    Total repaired wound length: 5.5 cm.     The patient tolerated the procedure well.    Complications: None    Sutures to be removed: 5    Dr. Herman was available for the procedure    Electronically signed by Driss Ladd DO on 1/12/2024 at 1:55 AM

## 2024-01-12 NOTE — PLAN OF CARE
Problem: Discharge Planning  Goal: Discharge to home or other facility with appropriate resources  Outcome: Progressing  Flowsheets (Taken 1/12/2024 1700)  Discharge to home or other facility with appropriate resources: Identify barriers to discharge with patient and caregiver     Problem: Safety - Adult  Goal: Free from fall injury  Outcome: Progressing
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
## 2024-01-12 NOTE — PROCEDURES
[facility] Neurodiagnostic Report    MRN: [id number]  PATIENT NAME: Shara Leary  DATE OF REPORT: 2024    DATE OF SERVICE: 2024  PHYSICIAN NAME: Driss Fry DO  Referring Physician: Dr Fry        Patient's : 1945  Patient's Age: 78 y.o.  Gender: female    PROCEDURE: Routine EEG with video      Clinical Interpretation:   This abnormal study showed evidence of:    Mild to moderate nonspecific cerebral dysfunction of the bilateral anterior temporal regions    Structural abnormalities should be considered for the findings above and appropriate imaging obtained if clinically indicated. No seizures or epileptiform discharges were noted during this study.      ____________________________  Electronically signed by: Driss Fry DO, 2024 12:18 PM      Patient Clinical Information   Reason for Study: Patient undergoing evaluation for seizure  Patient State: Somnolent  Primary neurological diagnosis: Seizure  Primary indication for monitoring: Characterization of seizures    Pertinent Medications and Treatments    lacosamide       Sedatives administered: No  Intubated: No  Pharmacological paralytic: No    Reporting Period  Start of Study: 1205, 2024   End of Study:  1236, 2024       EEG Description  Digital video and scalp EEG monitoring was performed using the standard protocol for this laboratory. Scalp electrodes were applied in the international 10/20 system. Multiple digital montage arrangements were utilized for evaluation. EKG and video were recorded.     Background:      Occipital rhythm (posterior dominant rhythm or PDR): Present  Frequency: 9 Hz  Voltage: Medium  Organization: fair   Reactivity to eye opening/closure: fair     Drowsiness: Present - normal  Sleep: Stage 2 present - normal    Comments: The background shows evidence of low amplitude generalized irregular beta activity    Technical and Activation Procedures:  Hyperventilation:

## 2024-01-12 NOTE — PROGRESS NOTES
77/52 -- -- 82 -- 93 % -- --   01/12/24 0230 (!) 85/54 -- -- 85 18 95 % -- --   01/12/24 0215 (!) 90/43 -- -- 84 17 95 % -- --   01/11/24 2306 117/69 -- -- 80 19 100 % -- --   01/11/24 2304 98/62 -- -- 83 -- 100 % -- --   01/11/24 2300 (!) 226/209 -- -- 92 19 100 % -- --   01/11/24 2256 (!) 170/153 -- -- 78 19 -- -- --   01/11/24 2255 (!) 163/76 -- -- 86 23 (!) 81 % -- --   01/11/24 2253 98/68 97.6 °F (36.4 °C) Oral 76 23 -- 1.651 m (5' 5\") 63.5 kg (140 lb)       No intake/output data recorded.  No intake/output data recorded.    No past medical history on file.    @[email or web address]@    Radiology:  CT HEAD WO CONTRAST   Final Result   1. Hyperattenuation and thickening of the bilateral lateral ventricle choroid   plexus, indeterminate.  No layering hyperattenuation within the occipital   horns as would be expected for acute intraventricular hemorrhage.  No   hydrocephalus. No significant mass effect or midline shift.  Consider further   evaluation with MRI.   2. Right posterior scalp contusion and laceration.   Critical results were called by Dr. Chapis Figueroa MD to Dr. Bermudez on   1/12/2024 at 00:08.         CT CERVICAL SPINE WO CONTRAST   Final Result   No acute abnormality of the cervical spine.         XR FEMUR LEFT 1 VW   Final Result   No acute fractures seen in the left femur from the left hip joint through the   area of the femoral condyles but the evaluation of the left knee is limited   on this study.  Consider x-ray series of left knee for evaluation of the   femoral condyles and left knee joint.         XR PELVIS (1-2 VIEWS)   Final Result   No acute fractures of the pelvic bones.  No acute fractures or dislocation of   the hip joints.         XR CHEST PORTABLE   Final Result   No acute cardiopulmonary process.         CT HEAD WO CONTRAST    (Results Pending)       PHYSICAL EXAM:     Central Nervous System  Loss of consciousness:  Yes    GCS:    Eye:  4 - Opens eyes on own  Motor:  6 - Follows simple motor
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
## 2024-01-13 ENCOUNTER — APPOINTMENT (OUTPATIENT)
Dept: MRI IMAGING | Age: 79
DRG: 101 | End: 2024-01-13
Payer: MEDICARE

## 2024-01-13 VITALS
DIASTOLIC BLOOD PRESSURE: 68 MMHG | WEIGHT: 140 LBS | BODY MASS INDEX: 23.32 KG/M2 | RESPIRATION RATE: 16 BRPM | HEIGHT: 65 IN | OXYGEN SATURATION: 96 % | HEART RATE: 71 BPM | SYSTOLIC BLOOD PRESSURE: 138 MMHG | TEMPERATURE: 97.3 F

## 2024-01-13 PROCEDURE — 99232 SBSQ HOSP IP/OBS MODERATE 35: CPT | Performed by: NURSE PRACTITIONER

## 2024-01-13 PROCEDURE — 99239 HOSP IP/OBS DSCHRG MGMT >30: CPT | Performed by: INTERNAL MEDICINE

## 2024-01-13 PROCEDURE — 70553 MRI BRAIN STEM W/O & W/DYE: CPT

## 2024-01-13 PROCEDURE — 2580000003 HC RX 258: Performed by: INTERNAL MEDICINE

## 2024-01-13 PROCEDURE — 6370000000 HC RX 637 (ALT 250 FOR IP): Performed by: PSYCHIATRY & NEUROLOGY

## 2024-01-13 PROCEDURE — 6360000004 HC RX CONTRAST MEDICATION: Performed by: RADIOLOGY

## 2024-01-13 PROCEDURE — A9579 GAD-BASE MR CONTRAST NOS,1ML: HCPCS | Performed by: RADIOLOGY

## 2024-01-13 RX ORDER — LACOSAMIDE 150 MG/1
150 TABLET ORAL 2 TIMES DAILY
Qty: 60 TABLET | Refills: 1 | Status: SHIPPED | OUTPATIENT
Start: 2024-01-13 | End: 2024-01-13

## 2024-01-13 RX ORDER — ARIPIPRAZOLE 2 MG/1
4 TABLET ORAL DAILY
Status: DISCONTINUED | OUTPATIENT
Start: 2024-01-13 | End: 2024-01-13 | Stop reason: HOSPADM

## 2024-01-13 RX ORDER — PREDNISONE 5 MG/1
2.5 TABLET ORAL DAILY
Status: DISCONTINUED | OUTPATIENT
Start: 2024-01-13 | End: 2024-01-13 | Stop reason: HOSPADM

## 2024-01-13 RX ORDER — LISINOPRIL 20 MG/1
20 TABLET ORAL DAILY
Status: DISCONTINUED | OUTPATIENT
Start: 2024-01-13 | End: 2024-01-13 | Stop reason: HOSPADM

## 2024-01-13 RX ORDER — LACOSAMIDE 150 MG/1
150 TABLET ORAL 2 TIMES DAILY
Qty: 60 TABLET | Refills: 1 | Status: SHIPPED | OUTPATIENT
Start: 2024-01-13 | End: 2024-03-13

## 2024-01-13 RX ORDER — ASPIRIN 81 MG/1
81 TABLET ORAL DAILY
Status: DISCONTINUED | OUTPATIENT
Start: 2024-01-13 | End: 2024-01-13 | Stop reason: HOSPADM

## 2024-01-13 RX ORDER — DONEPEZIL HYDROCHLORIDE 5 MG/1
10 TABLET, FILM COATED ORAL NIGHTLY
Status: DISCONTINUED | OUTPATIENT
Start: 2024-01-13 | End: 2024-01-13 | Stop reason: HOSPADM

## 2024-01-13 RX ORDER — AMLODIPINE BESYLATE 10 MG/1
10 TABLET ORAL DAILY
Status: DISCONTINUED | OUTPATIENT
Start: 2024-01-13 | End: 2024-01-13 | Stop reason: HOSPADM

## 2024-01-13 RX ADMIN — LACOSAMIDE 150 MG: 100 TABLET, FILM COATED ORAL at 08:24

## 2024-01-13 RX ADMIN — BACITRACIN ZINC: 500 OINTMENT TOPICAL at 08:25

## 2024-01-13 RX ADMIN — SODIUM CHLORIDE, PRESERVATIVE FREE 10 ML: 5 INJECTION INTRAVENOUS at 08:23

## 2024-01-13 RX ADMIN — GADOTERIDOL 13 ML: 279.3 INJECTION, SOLUTION INTRAVENOUS at 13:35

## 2024-01-13 NOTE — PROGRESS NOTES
Spoke to Bob on floor. Vimpat not in stock. New Rx will need to be sent to Pt's pharmacy of choice. Pharmacy unable to transfer due to medication being a controlled substance.

## 2024-01-13 NOTE — PLAN OF CARE
Problem: Discharge Planning  Goal: Discharge to home or other facility with appropriate resources  1/13/2024 1426 by Bianca Maciel RN  Outcome: Progressing  Flowsheets (Taken 1/13/2024 0730)  Discharge to home or other facility with appropriate resources: Identify barriers to discharge with patient and caregiver  1/13/2024 0643 by Jaqueline Diamond RN  Outcome: Progressing     Problem: Safety - Adult  Goal: Free from fall injury  1/13/2024 1426 by Bianca Maciel RN  Outcome: Progressing  1/13/2024 0643 by Jaqueline Diamond, RN  Outcome: Progressing     Problem: Pain  Goal: Verbalizes/displays adequate comfort level or baseline comfort level  1/13/2024 1426 by Bianca Maciel RN  Outcome: Progressing  1/13/2024 0643 by Jaqueline Diamond, RN  Outcome: Progressing
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
## 2024-01-13 NOTE — PLAN OF CARE
Problem: Discharge Planning  Goal: Discharge to home or other facility with appropriate resources  1/13/2024 0643 by Jaqueline Diamond, RN  Outcome: Progressing  Discharge to home or other facility with appropriate resources: Identify barriers to discharge with patient and caregiver     Problem: Safety - Adult  Goal: Free from fall injury  1/13/2024 0643 by Jaqueline Diamond, RN  Outcome: Progressing    Problem: Pain  Goal: Verbalizes/displays adequate comfort level or baseline comfort level  Outcome: Progressing

## 2024-01-13 NOTE — DISCHARGE SUMMARY
Hospital Medicine Discharge Summary    Patient ID: Shara Leary      Patient's PCP: No primary care provider on file.    Admit Date: 1/11/2024     Discharge Date:   01/13/24    Admitting Physician: Afshan Thomas MD     Discharge Physician: Amee Davenport MD     Discharge Diagnoses:       Active Hospital Problems    Diagnosis Date Noted    Seizure (HCC) [R56.9] 01/12/2024    Fall [W19.XXXA] 01/12/2024    Scalp laceration [S01.01XA] 01/12/2024    Head trauma [S09.90XA] 01/12/2024       The patient was seen and examined on day of discharge and this discharge summary is in conjunction with any daily progress note from day of discharge.    Hospital Course:     Ms. Shara Leary, a 78 y.o. year old female  who  has no past medical history on file. presents with Fall  , found on ground, trauma called as head trauma, thought to have seizure, as she did have seizure earlier today at her pcp office. Pt reports taking vimpat. Patient has another breakthrough seizure in th eED. EEG was negative. Neurology increased Vimpat to 150 mg BID. Patient was discharged home in stable condition. She suffered a head lac form fall and sutures are to be removed in 10 days by trauma clinic.      Exam:     /68   Pulse 71   Temp 97.3 °F (36.3 °C) (Temporal)   Resp 16   Ht 1.651 m (5' 5\")   Wt 63.5 kg (140 lb)   SpO2 96%   BMI 23.30 kg/m²     General appearance: No apparent distress, appears stated age and cooperative.  HEENT: Pupils equal, round, and reactive to light. Conjunctivae/corneas clear.  Neck: Supple, with full range of motion. No jugular venous distention. Trachea midline.  Respiratory:  Normal respiratory effort. Clear to auscultation, bilaterally without Rales/Wheezes/Rhonchi.  Cardiovascular: Regular rate and rhythm with normal S1/S2 without murmurs, rubs or gallops.  Abdomen: Soft, non-tender, non-distended with normal bowel sounds.  Musculoskeletal: No clubbing, cyanosis or edema  intracranial vessels. ORBITS: The visualized portion of the orbits demonstrate no acute abnormality. SINUSES: The visualized paranasal sinuses and mastoid air cells demonstrate no acute abnormality. SOFT TISSUES/SKULL:  No acute abnormality of the visualized skull.  Right posterior scalp contusion and laceration.     1. Hyperattenuation and thickening of the bilateral lateral ventricle choroid plexus, indeterminate.  No layering hyperattenuation within the occipital horns as would be expected for acute intraventricular hemorrhage.  No hydrocephalus. No significant mass effect or midline shift.  Consider further evaluation with MRI. 2. Right posterior scalp contusion and laceration. Critical results were called by Dr. Chapis Figueroa MD to Dr. Bermudez on 1/12/2024 at 00:08.     XR CHEST PORTABLE    Result Date: 1/11/2024  EXAMINATION: ONE XRAY VIEW OF THE CHEST 1/11/2024 11:03 pm COMPARISON: None. HISTORY: ORDERING SYSTEM PROVIDED HISTORY: trauma fall TECHNOLOGIST PROVIDED HISTORY: Reason for exam:->trauma fall What reading provider will be dictating this exam?->CRC FINDINGS: There is no subcutaneous emphysema pneumomediastinum or pneumothorax. Outlines of the heart and mediastinum preserved.  Heart has normal size. There is mild tortuous for the thoracic aorta in the mediastinum. Lungs are normally expanded.  No pulmonary parenchymal passes or contusion are seen.  No acute consolidations or infiltrates. There is no pleural effusions. There is no perihilar vascular congestion.     No acute cardiopulmonary process.     XR FEMUR LEFT 1 VW    Result Date: 1/11/2024  EXAMINATION: XRAY VIEWS OF THE LEFT FEMUR 1/11/2024 11:03 pm COMPARISON: None. HISTORY: ORDERING SYSTEM PROVIDED HISTORY: trauma fall TECHNOLOGIST PROVIDED HISTORY: Reason for exam:->trauma fall What reading provider will be dictating this exam?->CRC FINDINGS: Proximal left femur is evaluate on the x-ray series the pelvis.  No fracture seen in the left

## 2024-01-14 NOTE — ED PROVIDER NOTES
weight based adjustment of the mA/kV was utilized to reduce the radiation dose to as low as reasonably achievable. COMPARISON: 1/11/2024 HISTORY: ORDERING SYSTEM PROVIDED HISTORY: follow up post trauma TECHNOLOGIST PROVIDED HISTORY: Reason for exam:->follow up post trauma Has a \"code stroke\" or \"stroke alert\" been called?->No What reading provider will be dictating this exam?->CRC FINDINGS: No acute intracranial hemorrhage or edema.  No abnormal extra-axial fluid collections.  No hydrocephalus.  No evidence of calvarial fracture.  Foci of subcutaneous gas are associated with scalp overlying posterior right calvarium.     Stable CT head.  No acute intracranial hemorrhage or edema.     CT CERVICAL SPINE WO CONTRAST    Result Date: 1/12/2024  EXAMINATION: CT OF THE CERVICAL SPINE WITHOUT CONTRAST 1/11/2024 11:19 pm TECHNIQUE: CT of the cervical spine was performed without the administration of intravenous contrast. Multiplanar reformatted images are provided for review. Automated exposure control, iterative reconstruction, and/or weight based adjustment of the mA/kV was utilized to reduce the radiation dose to as low as reasonably achievable. COMPARISON: None. HISTORY: ORDERING SYSTEM PROVIDED HISTORY: fall TECHNOLOGIST PROVIDED HISTORY: Reason for exam:->fall Decision Support Exception - unselect if not a suspected or confirmed emergency medical condition->Emergency Medical Condition (MA) What reading provider will be dictating this exam?->CRC FINDINGS: BONES/ALIGNMENT: There is no acute fracture or traumatic malalignment. DEGENERATIVE CHANGES: There is multilevel degenerative disc disease, without significant spinal canal stenosis. SOFT TISSUES: There is no prevertebral soft tissue swelling.     No acute abnormality of the cervical spine.     CT HEAD WO CONTRAST    Result Date: 1/12/2024  EXAMINATION: CT OF THE HEAD WITHOUT CONTRAST  1/11/2024 11:19 pm TECHNIQUE: CT of the head was performed without the

## 2024-01-15 LAB
ABO/RH: NORMAL
ANTIBODY IDENTIFICATION: NORMAL
ANTIBODY SCREEN: POSITIVE
ARM BAND NUMBER: NORMAL
BLOOD BANK COMMENT: NORMAL
BLOOD BANK COMMENT: NORMAL
BLOOD BANK DISPENSE STATUS: NORMAL
BLOOD BANK SAMPLE EXPIRATION: NORMAL
BPU ID: NORMAL
COMPONENT: NORMAL
CROSSMATCH RESULT: NORMAL
DAT, POLYSPECIFIC: NEGATIVE
TRANSFUSION STATUS: NORMAL
UNIT DIVISION: 0